# Patient Record
(demographics unavailable — no encounter records)

---

## 2025-04-18 NOTE — HISTORY OF PRESENT ILLNESS
[FreeTextEntry1] : 39 yo male with pmhx as below is here for a f/up visit s/p stress echo 9/22 malave with mod effort occasional cp strong fhx of cad et unchanged lost few lbs et is stable

## 2025-04-18 NOTE — ASSESSMENT
[FreeTextEntry1] : 39 yo male with pmhx and presentation as above intermediate risk for cad htn/dyslipidemia obesity 2022 stress echo - low risk at high workload cont all meds weight reduction aggressive risk modif act as tolerated repeat labs reviewed, a1c and lipids discussed repeat stress echo rtc 9-12 months